# Patient Record
Sex: MALE | Race: AMERICAN INDIAN OR ALASKA NATIVE
[De-identification: names, ages, dates, MRNs, and addresses within clinical notes are randomized per-mention and may not be internally consistent; named-entity substitution may affect disease eponyms.]

---

## 2020-05-09 ENCOUNTER — HOSPITAL ENCOUNTER (EMERGENCY)
Dept: HOSPITAL 5 - ED | Age: 30
Discharge: HOME | End: 2020-05-09
Payer: SELF-PAY

## 2020-05-09 DIAGNOSIS — L73.9: ICD-10-CM

## 2020-05-09 DIAGNOSIS — F17.200: ICD-10-CM

## 2020-05-09 DIAGNOSIS — L03.314: Primary | ICD-10-CM

## 2020-05-09 DIAGNOSIS — Z79.899: ICD-10-CM

## 2020-05-09 PROCEDURE — 99282 EMERGENCY DEPT VISIT SF MDM: CPT

## 2020-05-09 NOTE — EMERGENCY DEPARTMENT REPORT
ED General Adult HPI





- General


Chief complaint: Skin/Abscess/Foreign Body


Stated complaint: BOIL


Source: patient


Mode of arrival: Ambulatory


Limitations: No Limitations





- History of Present Illness


Initial comments: 





Patient is a 29-year-old -American male with no past medical history 

presented to the ED with acute onset persistent painful erythematous 

maculopapular fluctuant rash on right inguinal area and right upper thigh for 

the last 5 days.  Patient states that the rash appeared after he shaved his 

pubic area with a razor blade.  Patient states that while awaiting to be 

evaluated by the ED provider, the rash started draining purulent discharge and 

was assisted to dress the wound by the triage nurse.  Patient denies dizziness, 

fever, chills, cough, nausea, vomiting, testicular pain, dysuria, urinary 

frequency and urgency, change in vision, numbness and tingling or weakness of 

right leg and back pain, hematuria or penile discharge.


MD Complaint: abscess; painful rash on right inguinal area


-: Sudden, days(s) (5)


Location: pelvis (Right inguinal area), right, lower extremity (Right inguinal 

and upper right)


Radiation: non-radiation


Severity scale (0 -10): 2


Quality: aching, dull


Consistency: constant


Improves with: none


Worsens with: none


Associated Symptoms: denies other symptoms.  denies: confusion, chest pain, 

cough, diaphoresis, fever/chills, headaches, loss of appetite, malaise, 

nausea/vomiting, rash, seizure, shortness of breath, syncope, weakness


Treatments Prior to Arrival: none





- Related Data


                                  Previous Rx's











 Medication  Instructions  Recorded  Last Taken  Type


 


Ibuprofen [Motrin] 600 mg PO Q8H PRN #20 tablet 05/09/20 Unknown Rx


 


Sulfamethoxazole/Trimethoprim 1 each PO BID #20 tablet 05/09/20 Unknown Rx





[Bactrim DS TAB]    











                                    Allergies











Allergy/AdvReac Type Severity Reaction Status Date / Time


 


No Known Allergies Allergy   Unverified 05/09/20 01:25














ED Review of Systems


ROS: 


Stated complaint: BOIL


Other details as noted in HPI





Constitutional: denies: chills, fever


Eyes: denies: eye pain, eye discharge, vision change


ENT: denies: ear pain, throat pain


Respiratory: denies: cough, shortness of breath, wheezing


Cardiovascular: denies: chest pain, palpitations


Endocrine: no symptoms reported


Gastrointestinal: denies: abdominal pain, nausea, diarrhea


Genitourinary: denies: urgency, dysuria


Musculoskeletal: arthralgia (Right inguinal pain due to erythematous 

maculopapular rash), myalgia.  denies: back pain, joint swelling


Skin: rash (Painful erythematous maculopapular rash on right inguinal area), 

change in color.  denies: lesions


Neurological: denies: headache, weakness, paresthesias


Psychiatric: denies: anxiety, depression


Hematological/Lymphatic: denies: easy bleeding, easy bruising





ED Past Medical Hx





- Past Medical History


Previous Medical History?: No





- Surgical History


Past Surgical History?: No





- Social History


Smoking Status: Current Some Day Smoker


Substance Use Type: None





- Medications


Home Medications: 


                                Home Medications











 Medication  Instructions  Recorded  Confirmed  Last Taken  Type


 


Ibuprofen [Motrin] 600 mg PO Q8H PRN #20 tablet 05/09/20  Unknown Rx


 


Sulfamethoxazole/Trimethoprim 1 each PO BID #20 tablet 05/09/20  Unknown Rx





[Bactrim DS TAB]     














ED Physical Exam





- General


Limitations: No Limitations


General appearance: alert, in no apparent distress





- Head


Head exam: Present: atraumatic, normocephalic, normal inspection





- Eye


Eye exam: Present: normal appearance, PERRL, EOMI





- ENT


ENT exam: Present: normal exam, normal orophraynx, mucous membranes moist, TM's 

normal bilaterally, normal external ear exam





- Neck


Neck exam: Present: normal inspection, full ROM





- Respiratory


Respiratory exam: Present: normal lung sounds bilaterally.  Absent: respiratory 

distress, wheezes, rales, chest wall tenderness, accessory muscle use, decreased

breath sounds





- Cardiovascular


Cardiovascular Exam: Present: regular rate, normal rhythm, normal heart sounds. 

Absent: systolic murmur, diastolic murmur, rubs, gallop





- GI/Abdominal


GI/Abdominal exam: Present: soft, normal bowel sounds.  Absent: tenderness, 

guarding, rebound, hyperactive bowel sounds





- 


 exam: Present: normal inspection


External exam: Present: normal external exam, other (Erythematous tender 

maculopapular rash on right inguinal area with purulent discharge)





- Extremities Exam


Extremities exam: Present: normal inspection, full ROM, normal capillary refill.

 Absent: pedal edema, joint swelling





- Back Exam


Back exam: Present: normal inspection, full ROM.  Absent: tenderness, muscle 

spasm





- Neurological Exam


Neurological exam: Present: alert, oriented X3, CN II-XII intact, normal gait





- Psychiatric


Psychiatric exam: Present: normal affect, normal mood





- Skin


Skin exam: Present: warm, dry, intact, normal color, rash (Erythematous 

maculopapular wound on right inguinal area with purulent discharge)





ED Course


                                   Vital Signs











  05/09/20





  01:21


 


Temperature 98.6 F


 


Pulse Rate 76


 


Respiratory 18





Rate 


 


Blood Pressure 143/98


 


O2 Sat by Pulse 100





Oximetry 














ED Medical Decision Making





- Medical Decision Making





This is a 29-year-old -American male with no past medical history 

presented to the ED with acute onset persistent painful erythematous 

maculopapular fluctuant rash on right inguinal area and right upper thigh for 

the last 5 days.  Patient states that the rash appeared after he shaved his 

pubic area with a razor blade.  Patient states that while awaiting to be 

evaluated by the ED provider, the rash started draining purulent discharge and 

was assisted to dress the wound by the triage nurse.  In the ED, patient is 

alert and oriented x3 and is not in distress.  The wound was cleaned and 

redressed and the patient was discharged home on pain medication and 

prophylactic antibiotics.  Patient symptoms are due to acute folliculitis or 

cellulitis due to shaving of the pubic area.  Patient was advised to follow-up 

with his primary care physician in 5 to 7 days for reevaluation or return to the

ED immediately if symptoms get worse.





- Differential Diagnosis


Acute folliculitis; cellulitis; cutaneous abscess; insect bite


Critical care attestation.: 


If time is entered above; I have spent that time in minutes in the direct care 

of this critically ill patient, excluding procedure time.








ED Disposition


Clinical Impression: 


 Acute folliculitis, Cellulitis of right groin





Disposition: DC-01 TO HOME OR SELFCARE


Is pt being admited?: No


Does the pt Need Aspirin: No


Condition: Stable


Instructions:  Cellulitis (ED), Folliculitis (ED)


Additional Instructions: 


Take medication with food, drink plenty of fluids and follow-up with your 

primary care physician in 5 to 7 days for reevaluation.  Return to the ED 

immediately if symptoms get worse.


Prescriptions: 


Sulfamethoxazole/Trimethoprim [Bactrim DS TAB] 1 each PO BID #20 tablet


Ibuprofen [Motrin] 600 mg PO Q8H PRN #20 tablet


 PRN Reason: Pain


Referrals: 


[facility] [Provider Group] - 7-10 days


Time of Disposition: 04:57


Print Language: ENGLISH

## 2022-06-26 ENCOUNTER — HOSPITAL ENCOUNTER (EMERGENCY)
Dept: HOSPITAL 5 - ED | Age: 32
Discharge: HOME | End: 2022-06-26
Payer: SELF-PAY

## 2022-06-26 VITALS — DIASTOLIC BLOOD PRESSURE: 86 MMHG | SYSTOLIC BLOOD PRESSURE: 132 MMHG
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
## 2022-06-26 DIAGNOSIS — S96.911A: Primary | ICD-10-CM
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
## 2022-06-26 DIAGNOSIS — S62.606A: ICD-10-CM
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
## 2022-06-26 PROCEDURE — 99283 EMERGENCY DEPT VISIT LOW MDM: CPT

## 2022-06-26 NOTE — EMERGENCY DEPARTMENT REPORT
ED General Adult HPI





- General


Chief complaint: Extremity Injury, Upper


Stated complaint: RT HAND AND FOOT INJURY


Time Seen by Provider: 06/26/22 20:55


Source: patient


Mode of arrival: Ambulatory


Limitations: No Limitations





- History of Present Illness


Initial comments: 





31-year-old male presenting department complaining of injury to the hand and 

foot at work.  Afebrile.  He reports falling down and striking his fifth finger 

on the ball as it rolled over involving his right foot/ankle resulting in pain 

to the foot and hand.  Ports no numbness, no tingling, no no head injury no loss

of consciousness no blurred vision no dizziness.


-: Gradual


Radiation: non-radiation


Severity scale (0 -10): 8


Quality: dull


Consistency: constant


Improves with: none


Worsens with: none


Associated Symptoms: denies: chest pain, diaphoresis, malaise, nausea/vomiting


Treatments Prior to Arrival: none





- Related Data


                                  Previous Rx's











 Medication  Instructions  Recorded  Last Taken  Type


 


Ibuprofen [Motrin] 600 mg PO Q8H PRN #20 tablet 05/09/20 Unknown Rx


 


Sulfamethoxazole/Trimethoprim 1 each PO BID #20 tablet 05/09/20 Unknown Rx





[Bactrim DS TAB]    


 


Acetaminophen/Codeine [Tylenol 1 tab PO Q6H PRN #14 tab 06/26/22 Unknown Rx





/Codeine # 3 tab]    











                                    Allergies











Allergy/AdvReac Type Severity Reaction Status Date / Time


 


No Known Allergies Allergy   Unverified 05/09/20 01:25














ED Review of Systems


ROS: 


Stated complaint: RT HAND AND FOOT INJURY


Other details as noted in HPI





Comment: All other systems reviewed and negative





ED Past Medical Hx





- Past Medical History


Previous Medical History?: No





- Surgical History


Past Surgical History?: No





- Social History


Smoking Status: Never Smoker





- Medications


Home Medications: 


                                Home Medications











 Medication  Instructions  Recorded  Confirmed  Last Taken  Type


 


Ibuprofen [Motrin] 600 mg PO Q8H PRN #20 tablet 05/09/20  Unknown Rx


 


Sulfamethoxazole/Trimethoprim 1 each PO BID #20 tablet 05/09/20  Unknown Rx





[Bactrim DS TAB]     


 


Acetaminophen/Codeine [Tylenol 1 tab PO Q6H PRN #14 tab 06/26/22  Unknown Rx





/Codeine # 3 tab]     














ED Physical Exam





- General


Limitations: No Limitations


General appearance: alert, in no apparent distress





- Head


Head exam: Present: atraumatic, normocephalic





- Eye


Eye exam: Present: normal appearance, PERRL, EOMI


Pupils: Present: normal accommodation





- ENT


ENT exam: Present: normal exam, normal orophraynx, mucous membranes moist, TM's 

normal bilaterally





- Neck


Neck exam: Present: normal inspection, full ROM





- Respiratory


Respiratory exam: Present: normal lung sounds bilaterally.  Absent: respiratory 

distress, wheezes, rales, chest wall tenderness, accessory muscle use





- Cardiovascular


Cardiovascular Exam: Present: regular rate, normal rhythm.  Absent: systolic 

murmur, diastolic murmur, rubs, gallop





- GI/Abdominal


GI/Abdominal exam: Present: soft, normal bowel sounds





- Rectal


Rectal exam: Present: deferred





- Extremities Exam


Extremities exam: Present: normal inspection, tenderness, normal capillary 

refill





- Expanded Upper Extremity Exam


  ** Right


Hand Wrist exam: Present: tenderness, swelling


Hand L/R Back: 


                            __________________________














                            __________________________





 1 - Pain and swelling to this region worse with flexion extension cap refills 

brisk





Neurosensory exam: Present: 2-point discrimination


Vascular: Present: normal capillary refill





- Expanded Lower Extremity Exam


  ** Right


Foot/Toe exam: Present: tenderness, swelling.  Absent: ecchymosis, deformity, 

calcaneal tenderness, tenderness at base of 5th metatarsal


Neuro vascular tendon exam: Present: no vascular compromise





                            __________________________














                            __________________________





 1 - Tenderness to this region








- Back Exam


Back exam: Present: normal inspection.  Absent: CVA tenderness (R), CVA 

tenderness (L)





- Neurological Exam


Neurological exam: Present: alert, oriented X3, CN II-XII intact





- Psychiatric


Psychiatric exam: Present: normal affect, normal mood





- Skin


Skin exam: Present: warm, dry, intact, normal color.  Absent: rash





ED Course





                                   Vital Signs











  06/26/22





  16:02


 


Temperature 98.9 F


 


Pulse Rate 68


 


Respiratory 14





Rate 


 


Blood Pressure 124/81


 


O2 Sat by Pulse 100





Oximetry 














- Procedure Description


Procedures done: Aluminum finger splint placed in the right hand and Velcro 

stirrup to the right foot/


Critical care attestation.: 


If time is entered above; I have spent that time in minutes in the direct care 

of this critically ill patient, excluding procedure time.








ED Disposition


Clinical Impression: 


 Right foot strain, Finger fracture, right





Disposition: 01 HOME / SELF CARE / HOMELESS


Is pt being admited?: No


Does the pt Need Aspirin: No


Condition: Stable


Instructions:  How to Use a Stirrup Ankle Brace, Easy-to-Read, Finger Fracture, 

Adult, Elastic Bandage and RICE Therapy


Prescriptions: 


Acetaminophen/Codeine [Tylenol /Codeine # 3 tab] 1 tab PO Q6H PRN #14 tab


 PRN Reason: pain


Referrals: 


[facility] [Provider Group] - 3-5 Days

## 2022-06-26 NOTE — XRAY REPORT
RIGHT FOOT 3 VIEW(S)



INDICATION / CLINICAL INFORMATION: injury



COMPARISON: None available.

 

FINDINGS:



BONES / JOINT(S): No acute fracture or subluxation. No significant arthritis.

SOFT TISSUES: No significant abnormality.



ADDITIONAL FINDINGS: None.



IMPRESSION:

1. No acute findings.



Signer Name: Surendra Salcido MD 

Signed: 6/26/2022 4:52 PM

Workstation Name: [id number]

## 2022-06-26 NOTE — XRAY REPORT
RIGHT HAND 4 VIEW(S)



INDICATION / CLINICAL INFORMATION: injury



COMPARISON: None available.

 

FINDINGS:



BONES / JOINT(S): Acute mildly displaced fracture involving the distal ring finger metacarpal neck. N
o significant arthritis.

SOFT TISSUES: Dorsal soft tissue swelling



ADDITIONAL FINDINGS: None.



IMPRESSION:

1. Acute mildly displaced fracture involving distal ring finger metacarpal neck



Signer Name: Surendra Salcido MD 

Signed: 6/26/2022 5:28 PM

Workstation Name: [id number]